# Patient Record
Sex: FEMALE | Race: WHITE | NOT HISPANIC OR LATINO
[De-identification: names, ages, dates, MRNs, and addresses within clinical notes are randomized per-mention and may not be internally consistent; named-entity substitution may affect disease eponyms.]

---

## 2019-01-16 ENCOUNTER — TRANSCRIPTION ENCOUNTER (OUTPATIENT)
Age: 29
End: 2019-01-16

## 2019-10-13 ENCOUNTER — TRANSCRIPTION ENCOUNTER (OUTPATIENT)
Age: 29
End: 2019-10-13

## 2022-07-08 PROBLEM — Z00.00 ENCOUNTER FOR PREVENTIVE HEALTH EXAMINATION: Status: ACTIVE | Noted: 2022-07-08

## 2022-09-06 ENCOUNTER — APPOINTMENT (OUTPATIENT)
Dept: NEUROLOGY | Facility: CLINIC | Age: 32
End: 2022-09-06

## 2022-10-11 ENCOUNTER — APPOINTMENT (OUTPATIENT)
Dept: NEUROLOGY | Facility: CLINIC | Age: 32
End: 2022-10-11

## 2022-10-19 ENCOUNTER — RX RENEWAL (OUTPATIENT)
Age: 32
End: 2022-10-19

## 2022-12-08 ENCOUNTER — APPOINTMENT (OUTPATIENT)
Dept: NEUROLOGY | Facility: CLINIC | Age: 32
End: 2022-12-08

## 2022-12-08 VITALS
WEIGHT: 170 LBS | HEIGHT: 68 IN | HEART RATE: 77 BPM | DIASTOLIC BLOOD PRESSURE: 100 MMHG | BODY MASS INDEX: 25.76 KG/M2 | SYSTOLIC BLOOD PRESSURE: 153 MMHG

## 2022-12-08 PROCEDURE — 99214 OFFICE O/P EST MOD 30 MIN: CPT

## 2022-12-08 NOTE — ASSESSMENT
[FreeTextEntry1] : Chronic migraine-\par Patient will continue with her Ajovy and sumatriptan for treatment of migraines. We'll follow up with her in 5-6 months barring problems.\par \par I, Marco Steward, attest that this documentation has been prepared under the direction and in the presence of Provider Winston Felder \par \par Thank you for allowing me to assist in the management of this patient.\par \par \par Prieto Solomon DO\yara Board Certified, Neurology\par

## 2022-12-09 RX ORDER — FREMANEZUMAB-VFRM 225 MG/1.5ML
225 INJECTION SUBCUTANEOUS
Qty: 1 | Refills: 11 | Status: ACTIVE | COMMUNITY
Start: 2022-10-21 | End: 1900-01-01

## 2023-08-09 ENCOUNTER — APPOINTMENT (OUTPATIENT)
Dept: NEUROLOGY | Facility: CLINIC | Age: 33
End: 2023-08-09

## 2023-08-31 ENCOUNTER — APPOINTMENT (OUTPATIENT)
Dept: NEUROLOGY | Facility: CLINIC | Age: 33
End: 2023-08-31
Payer: COMMERCIAL

## 2023-08-31 PROCEDURE — 99214 OFFICE O/P EST MOD 30 MIN: CPT

## 2023-08-31 NOTE — HISTORY OF PRESENT ILLNESS
[FreeTextEntry1] : Ms. FRANKLIN GOSS returns to the office for follow-up and her prior history and physical have been reviewed and she reports no change since last visit.  she was last seen over a year ago for the treatment of her chronic migraine headaches.  With the regimen of Ajovy for preventative therapy as well as sumatriptan for abortive therapy, her migraine headache has been stable.  She does get increasing severity and frequency of headaches with weather as well as with stress.  She has been planning her wedding which is in 2 days therefore she has been getting more headache due to stress.  She is interested in getting pregnant after her wedding.  In the past she had tried multiple preventative strategies/agents including Topamax, propranolol, Botox but none of them worked.  She has been taking magnesium on her own which has not helped either

## 2023-08-31 NOTE — ASSESSMENT
[FreeTextEntry1] : Chronic Migraine Without Aura follow up.  –We had a long discussion about Ajovy and pregnancy.  Ajovy needs to stop for at least 6-month before pregnancy, therefore she would like to stop Ajovy now so she can get pregnant soon.  We have decided to replace it with Qulipta which only needs to be stopped a week before.  She can continue to use sumatriptan for abortive therapy until she is pregnant.     I, Enedina Davila, Attest that this documentation has been prepared under the direction and in the presence of Provider Prieto Solomon DO  Thank You for letting me assist in the management of this patient.   Prieto Solomon DO Board Certified, Neurology

## 2023-11-29 ENCOUNTER — APPOINTMENT (OUTPATIENT)
Dept: NEUROLOGY | Facility: CLINIC | Age: 33
End: 2023-11-29

## 2023-12-07 ENCOUNTER — APPOINTMENT (OUTPATIENT)
Dept: NEUROLOGY | Facility: CLINIC | Age: 33
End: 2023-12-07
Payer: COMMERCIAL

## 2023-12-07 VITALS — WEIGHT: 170 LBS | BODY MASS INDEX: 25.76 KG/M2 | HEIGHT: 68 IN

## 2023-12-07 DIAGNOSIS — G43.709 CHRONIC MIGRAINE W/OUT AURA, NOT INTRACTABLE, W/OUT STATUS MIGRAINOSUS: ICD-10-CM

## 2023-12-07 PROCEDURE — 99213 OFFICE O/P EST LOW 20 MIN: CPT

## 2023-12-07 RX ORDER — SUMATRIPTAN 100 MG/1
100 TABLET, FILM COATED ORAL
Qty: 12 | Refills: 11 | Status: ACTIVE | COMMUNITY
Start: 2022-07-19 | End: 1900-01-01

## 2023-12-20 ENCOUNTER — APPOINTMENT (OUTPATIENT)
Dept: NEUROLOGY | Facility: CLINIC | Age: 33
End: 2023-12-20

## 2024-05-20 ENCOUNTER — APPOINTMENT (OUTPATIENT)
Dept: ORTHOPEDIC SURGERY | Facility: CLINIC | Age: 34
End: 2024-05-20
Payer: COMMERCIAL

## 2024-05-20 VITALS — HEIGHT: 68 IN | BODY MASS INDEX: 26.07 KG/M2 | WEIGHT: 172 LBS

## 2024-05-20 DIAGNOSIS — S46.812A STRAIN OF OTHER MUSCLES, FASCIA AND TENDONS AT SHOULDER AND UPPER ARM LEVEL, LEFT ARM, INITIAL ENCOUNTER: ICD-10-CM

## 2024-05-20 PROCEDURE — 73030 X-RAY EXAM OF SHOULDER: CPT | Mod: LT

## 2024-05-20 PROCEDURE — 99203 OFFICE O/P NEW LOW 30 MIN: CPT

## 2024-05-20 NOTE — DISCUSSION/SUMMARY
[de-identified] : Chief complaint: Left shoulder pain  HPI: Patient is a 33-year-old right-hand-dominant female who presents the office today for the evaluation of left shoulder pain.  She localizes the pain to the left posterior trapezius by pointing.  Patient reports that she was exercising approximately 1 week ago when she felt as though she might have strained her left trapezius.  Since that time she has been having intermittent pain to the left posterior trapezius which is becoming more constant.  She has taken Tylenol with no significant relief.  She has applied IcyHot to the area with some relief.  She has also used a heating pad to the area with no significant relief.  Patient presented to her primary care physician who told her that she had a muscle strain and was going to prescribe her a muscle relaxer however she deferred.  She denies taking any NSAIDs.  Denies any previous surgery or injury to the left shoulder in the past.  Denies any numbness or tingling down the left upper extremity.  Denies any neck pain.  ROS: Positive for left trapezius pain  Physical examination of the left shoulder shows: No erythema  No ecchymosis  Skin is intact Full painless range of motion in cervical flexion, extension, lateral rotation bilaterally, and lateral flexion bilaterally Forward flexion 0-180 degrees Horizontal abduction from 0 to 170 degrees Internal rotation to contralateral scapula No tenderness to palpation over midline cervical spine, bilateral paraspinal cervical musculature, left trapezius, left scapula, left shoulder Negative glenohumeral crepitus Negative O'Briens Test Negative Speed's Negative German / Jose Juan Impingement Test Negative Neer's Test Negative empty Can test Negative cross arm adduction  3 views of the left shoulder performed the office today show no obvious acute fracture, subluxation, or dislocation  Assessment/plan: Strain of left trapezius, discussed with the patient that muscle strains can take 4-6 weeks to heal, prescription oral NSAID was deferred at this time as well as a muscle relaxer, patient can take over-the-counter Advil, Motrin, Aleve, or Tylenol for pain relief, she can continue with a heating pad to the area on an as-needed basis with sensory precautions, patient was provided with a prescription for formal physical therapy so that she can get started on that, patient will be provided with a 6-week follow-up with either KASHMIR Donohue or KASHMIR Harley, patient verbalized understanding of all findings in the office today, she agrees to follow-up as directed

## 2024-07-11 ENCOUNTER — APPOINTMENT (OUTPATIENT)
Dept: ORTHOPEDIC SURGERY | Facility: CLINIC | Age: 34
End: 2024-07-11